# Patient Record
Sex: FEMALE | Race: WHITE | NOT HISPANIC OR LATINO | Employment: OTHER | URBAN - METROPOLITAN AREA
[De-identification: names, ages, dates, MRNs, and addresses within clinical notes are randomized per-mention and may not be internally consistent; named-entity substitution may affect disease eponyms.]

---

## 2022-12-26 ENCOUNTER — HOSPITAL ENCOUNTER (EMERGENCY)
Facility: MEDICAL CENTER | Age: 72
End: 2022-12-26
Attending: EMERGENCY MEDICINE
Payer: MEDICARE

## 2022-12-26 VITALS
RESPIRATION RATE: 16 BRPM | WEIGHT: 162 LBS | OXYGEN SATURATION: 95 % | HEIGHT: 60 IN | HEART RATE: 82 BPM | DIASTOLIC BLOOD PRESSURE: 61 MMHG | TEMPERATURE: 98.1 F | BODY MASS INDEX: 31.8 KG/M2 | SYSTOLIC BLOOD PRESSURE: 126 MMHG

## 2022-12-26 DIAGNOSIS — T40.601A: ICD-10-CM

## 2022-12-26 LAB
ALBUMIN SERPL BCP-MCNC: 3.7 G/DL (ref 3.2–4.9)
ALBUMIN/GLOB SERPL: 1.7 G/DL
ALP SERPL-CCNC: 104 U/L (ref 30–99)
ALT SERPL-CCNC: 8 U/L (ref 2–50)
ANION GAP SERPL CALC-SCNC: 11 MMOL/L (ref 7–16)
AST SERPL-CCNC: 15 U/L (ref 12–45)
BASOPHILS # BLD AUTO: 0.3 % (ref 0–1.8)
BASOPHILS # BLD: 0.02 K/UL (ref 0–0.12)
BILIRUB SERPL-MCNC: 0.2 MG/DL (ref 0.1–1.5)
BUN SERPL-MCNC: 14 MG/DL (ref 8–22)
CALCIUM ALBUM COR SERPL-MCNC: 9.1 MG/DL (ref 8.5–10.5)
CALCIUM SERPL-MCNC: 8.9 MG/DL (ref 8.5–10.5)
CHLORIDE SERPL-SCNC: 105 MMOL/L (ref 96–112)
CO2 SERPL-SCNC: 22 MMOL/L (ref 20–33)
CREAT SERPL-MCNC: 0.82 MG/DL (ref 0.5–1.4)
EOSINOPHIL # BLD AUTO: 0.07 K/UL (ref 0–0.51)
EOSINOPHIL NFR BLD: 1 % (ref 0–6.9)
ERYTHROCYTE [DISTWIDTH] IN BLOOD BY AUTOMATED COUNT: 55.4 FL (ref 35.9–50)
GFR SERPLBLD CREATININE-BSD FMLA CKD-EPI: 76 ML/MIN/1.73 M 2
GLOBULIN SER CALC-MCNC: 2.2 G/DL (ref 1.9–3.5)
GLUCOSE SERPL-MCNC: 100 MG/DL (ref 65–99)
HCT VFR BLD AUTO: 28.2 % (ref 37–47)
HGB BLD-MCNC: 8.6 G/DL (ref 12–16)
IMM GRANULOCYTES # BLD AUTO: 0.03 K/UL (ref 0–0.11)
IMM GRANULOCYTES NFR BLD AUTO: 0.4 % (ref 0–0.9)
LYMPHOCYTES # BLD AUTO: 0.6 K/UL (ref 1–4.8)
LYMPHOCYTES NFR BLD: 8.5 % (ref 22–41)
MCH RBC QN AUTO: 24.4 PG (ref 27–33)
MCHC RBC AUTO-ENTMCNC: 30.5 G/DL (ref 33.6–35)
MCV RBC AUTO: 79.9 FL (ref 81.4–97.8)
MONOCYTES # BLD AUTO: 0.39 K/UL (ref 0–0.85)
MONOCYTES NFR BLD AUTO: 5.5 % (ref 0–13.4)
NEUTROPHILS # BLD AUTO: 5.97 K/UL (ref 2–7.15)
NEUTROPHILS NFR BLD: 84.3 % (ref 44–72)
NRBC # BLD AUTO: 0 K/UL
NRBC BLD-RTO: 0 /100 WBC
PLATELET # BLD AUTO: 272 K/UL (ref 164–446)
PMV BLD AUTO: 8.9 FL (ref 9–12.9)
POTASSIUM SERPL-SCNC: 4.1 MMOL/L (ref 3.6–5.5)
PROT SERPL-MCNC: 5.9 G/DL (ref 6–8.2)
RBC # BLD AUTO: 3.53 M/UL (ref 4.2–5.4)
SODIUM SERPL-SCNC: 138 MMOL/L (ref 135–145)
TROPONIN T SERPL-MCNC: 10 NG/L (ref 6–19)
WBC # BLD AUTO: 7.1 K/UL (ref 4.8–10.8)

## 2022-12-26 PROCEDURE — 84484 ASSAY OF TROPONIN QUANT: CPT

## 2022-12-26 PROCEDURE — 36415 COLL VENOUS BLD VENIPUNCTURE: CPT

## 2022-12-26 PROCEDURE — 99284 EMERGENCY DEPT VISIT MOD MDM: CPT

## 2022-12-26 PROCEDURE — 80053 COMPREHEN METABOLIC PANEL: CPT

## 2022-12-26 PROCEDURE — 85025 COMPLETE CBC W/AUTO DIFF WBC: CPT

## 2022-12-26 RX ORDER — LEVOTHYROXINE SODIUM 0.1 MG/1
TABLET ORAL
COMMUNITY

## 2022-12-26 NOTE — DISCHARGE INSTRUCTIONS
Make sure that you always talk to your doctor regarding pain management  Please do not take anyone else's medications  Please talk to your doctor if you take any narcotic pain medicine.

## 2022-12-26 NOTE — ED TRIAGE NOTES
Pt arrived by [facility].  called for EMS after patient took his Norco 30mg. Pt was found in the field lethargic, Aox2, GCS 14. Pt was given 1.5mg narcan prior to arrival to [facility]. Pt is currently Aox4, VSS. Pt states she took her husbands norco for chronic back pain. Pt states back pain is currently 0/10.

## 2022-12-26 NOTE — ED PROVIDER NOTES
ED Provider Note    CHIEF COMPLAINT  Altered mental status      LIMITATION TO HISTORY   Select: Patient is sleeping but easily arousable.  Slightly limited if he just goes back to sleep.    HPI  Darcy Ady Sanchez is a 72 y.o. female who brought in by EMS for what appears to be altered mental status.  Patient cannot recall much she states that she simply just was urinating in bed.  This is unlike her she has never done it before.  She is unsure how it happened and because of that and generalized weakness she could not get out of bed ambulance was called.    As per EMS report which reported to the nurse patient has chronic back pain apparently took her 's Norco.  Patient does state that she took her 's Norco last night.  Apparently was difficult to arouse the patient could not be aroused    Stupid so EMS came they gave Narcan which woke her up and she is currently here now with our ER.    OUTSIDE HISTORIAN(S):  This time we are waiting for the  to give us more of a history.  EXTERNAL RECORDS REVIEWED  Nothing from the chart noted in our system.    REVIEW OF SYSTEMS  General: No fever or chills.  Eyes: No eye discharge. No eye pain.  Ear nose throat: No sore throat or  trouble swallowing.  Pulmonary: No shortness of breath or cough.  Cardiovascular: No chest pain or chest pressure.  GI: No abdominal pain nausea or vomiting.  : No dysuria or hematuria  Dermatologic: No rashes. No abrasions.  Neurologic: Urinary incontinence  All other systems are negative      PAST MEDICAL HISTORY   has a past medical history of Hypothyroid.    SURGICAL HISTORY  patient denies any surgical history    FAMILY HISTORY  No family history on file.    SOCIAL HISTORY  Social History     Tobacco Use    Smoking status: Not on file    Smokeless tobacco: Not on file   Substance and Sexual Activity    Alcohol use: Not on file    Drug use: Not on file    Sexual activity: Not on file       CURRENT MEDICATIONS  Home  Medications    **Home medications have not yet been reviewed for this encounter**         ALLERGIES  No Known Allergies    PHYSICAL EXAM  VITAL SIGNS: /60   Pulse 85   Resp 18   Ht 1.524 m (5')   Wt 73.5 kg (162 lb)   SpO2 97%   BMI 31.64 kg/m²    General well-nourished female no acute distress stated age sleeping easily arousable  Eyes: Pupils equal round reactive by 2 to 3 mm  Ear nose throat no tongue lacerations or bruising noted  Pulmonary: Lungs are clear bilaterally no wheezes rales or rhonchi  Cardiovascular regular rhythm rate of 85 no murmurs negative Homans negative cords no pedal edema  GI nondistended nontender soft no rebound no guarding  Neurologic alert oriented moves all extremities      LABS  Results for orders placed or performed during the hospital encounter of 12/26/22   CBC WITH DIFFERENTIAL   Result Value Ref Range    WBC 7.1 4.8 - 10.8 K/uL    RBC 3.53 (L) 4.20 - 5.40 M/uL    Hemoglobin 8.6 (L) 12.0 - 16.0 g/dL    Hematocrit 28.2 (L) 37.0 - 47.0 %    MCV 79.9 (L) 81.4 - 97.8 fL    MCH 24.4 (L) 27.0 - 33.0 pg    MCHC 30.5 (L) 33.6 - 35.0 g/dL    RDW 55.4 (H) 35.9 - 50.0 fL    Platelet Count 272 164 - 446 K/uL    MPV 8.9 (L) 9.0 - 12.9 fL    Neutrophils-Polys 84.30 (H) 44.00 - 72.00 %    Lymphocytes 8.50 (L) 22.00 - 41.00 %    Monocytes 5.50 0.00 - 13.40 %    Eosinophils 1.00 0.00 - 6.90 %    Basophils 0.30 0.00 - 1.80 %    Immature Granulocytes 0.40 0.00 - 0.90 %    Nucleated RBC 0.00 /100 WBC    Neutrophils (Absolute) 5.97 2.00 - 7.15 K/uL    Lymphs (Absolute) 0.60 (L) 1.00 - 4.80 K/uL    Monos (Absolute) 0.39 0.00 - 0.85 K/uL    Eos (Absolute) 0.07 0.00 - 0.51 K/uL    Baso (Absolute) 0.02 0.00 - 0.12 K/uL    Immature Granulocytes (abs) 0.03 0.00 - 0.11 K/uL    NRBC (Absolute) 0.00 K/uL   COMP METABOLIC PANEL   Result Value Ref Range    Sodium 138 135 - 145 mmol/L    Potassium 4.1 3.6 - 5.5 mmol/L    Chloride 105 96 - 112 mmol/L    Co2 22 20 - 33 mmol/L    Anion Gap 11.0 7.0 -  "16.0    Glucose 100 (H) 65 - 99 mg/dL    Bun 14 8 - 22 mg/dL    Creatinine 0.82 0.50 - 1.40 mg/dL    Calcium 8.9 8.5 - 10.5 mg/dL    AST(SGOT) 15 12 - 45 U/L    ALT(SGPT) 8 2 - 50 U/L    Alkaline Phosphatase 104 (H) 30 - 99 U/L    Total Bilirubin 0.2 0.1 - 1.5 mg/dL    Albumin 3.7 3.2 - 4.9 g/dL    Total Protein 5.9 (L) 6.0 - 8.2 g/dL    Globulin 2.2 1.9 - 3.5 g/dL    A-G Ratio 1.7 g/dL   TROPONIN   Result Value Ref Range    Troponin T 10 6 - 19 ng/L   CORRECTED CALCIUM   Result Value Ref Range    Correct Calcium 9.1 8.5 - 10.5 mg/dL   ESTIMATED GFR   Result Value Ref Range    GFR (CKD-EPI) 76 >60 mL/min/1.73 m 2          COURSE & MEDICAL DECISION MAKING  Pertinent Labs & Imaging studies reviewed. (See chart for details)    Differential Diagnosis Considered  Medication misuse urinary incontinence.  This point, were not really exactly sure the issue from the history of seizures the patient took her 's Norco urinated in her bed and subsequently was difficult to arouse she according to EMS responded well to Narcan she awake and alert at this time and does not appear to be confused and in distress.  No strokelike symptoms noted.  Vital signs seem normal.  She has no back pain.  There is no signs of cauda equina syndrome.    At this point plan is to wait for the  to get here we will do some preliminary lab work.  Escalation of care considered, and ultimately not performed: blood analysis.    No barriers to care    Diagnostic tests and prescription drugs considered including, but not limited to: Select: Patient received blood work and observation .    No other issues except her anemia was discussed she states that she has had transfusions in the past  No further extra management with appropriate sources.    3:57 PM  Patient is awake and alert.  She states that \"I probably did take an extra Norco\" but cannot confirm.  Regardless she is awake she is alert there is a history by EMS that she took an extra Norco " she responded to Narcan at this point understands that she may have taken she was encouraged not to overdose on medications and obviously talk to her doctor regarding any new or changing of medication.  I find no acute issues at this point her anemia is appears to be chronic by history.    They are leaving for [place] today.  To go home.  Recommend she follow-up with her primary care doctor    FINAL IMPRESSION  Unintentional overdose of Norco by history  Her mental status resolved       Electronically signed by: Juice Ha M.D., 12/26/2022 1:59 PM